# Patient Record
Sex: FEMALE | Race: BLACK OR AFRICAN AMERICAN | NOT HISPANIC OR LATINO | URBAN - METROPOLITAN AREA
[De-identification: names, ages, dates, MRNs, and addresses within clinical notes are randomized per-mention and may not be internally consistent; named-entity substitution may affect disease eponyms.]

---

## 2018-09-20 PROBLEM — 271832001 FLATULENCE, ERUCTATION AND GAS PAIN: Status: ACTIVE | Noted: 2018-09-20

## 2018-09-20 PROBLEM — 14760008 CONSTIPATION: Status: ACTIVE | Noted: 2018-09-20

## 2018-09-20 PROBLEM — 442076002 EARLY SATIETY: Status: ACTIVE | Noted: 2018-09-20

## 2018-09-20 PROBLEM — 275978004 SCREENING FOR MALIGNANT NEOPLASM OF COLON: Status: ACTIVE | Noted: 2018-09-20

## 2018-11-30 PROBLEM — 80774000 HELICOBACTER PYLORI: Status: ACTIVE | Noted: 2018-11-30

## 2022-04-30 ENCOUNTER — TELEPHONE ENCOUNTER (OUTPATIENT)
Dept: URBAN - METROPOLITAN AREA CLINIC 121 | Facility: CLINIC | Age: 62
End: 2022-04-30

## 2022-05-01 ENCOUNTER — TELEPHONE ENCOUNTER (OUTPATIENT)
Dept: URBAN - METROPOLITAN AREA CLINIC 121 | Facility: CLINIC | Age: 62
End: 2022-05-01

## 2022-05-01 RX ORDER — EUCALYPTUS OIL/MENTHOL/CAMPHOR 1.2%-4.8%
OINTMENT (GRAM) TOPICAL
Status: ACTIVE | COMMUNITY
Start: 2018-09-20

## 2022-05-01 RX ORDER — DOXYCYCLINE HYCLATE 100 MG/1
TAKE 1 TABLET PO WITH DINNER FOR 14 DAYS FOR H. PYLORI TABLET ORAL
Status: ACTIVE | COMMUNITY
Start: 2018-09-20

## 2022-05-01 RX ORDER — NITAZOXANIDE 500 MG/1
TAKE 1 TABLET WITH BREAKFAST, AND 1 TABLET WITH DINNER FOR 14 DAYS, FOR H.PYLORI TABLET ORAL
Status: ACTIVE | COMMUNITY
Start: 2018-09-20

## 2022-05-01 RX ORDER — IRON HEME POLYPEPTIDE/FOLIC AC 12-1MG
TABLET ORAL
Status: ACTIVE | COMMUNITY
Start: 2018-09-20

## 2022-05-01 RX ORDER — PROGESTERONE 100 MG/1
QD CAPSULE ORAL
Status: ACTIVE | COMMUNITY
Start: 2018-09-20

## 2022-05-01 RX ORDER — OMEGA-3/DHA/EPA/FISH OIL 1000 MG
QD CAPSULE ORAL
Status: ACTIVE | COMMUNITY
Start: 2018-09-20

## 2022-05-01 RX ORDER — LEVOFLOXACIN 250 MG/1
TAKE 1 TABLET WITH BREAKFAST FOR 14 DAYS FOR H. PYLORI TABLET, FILM COATED ORAL
Status: ACTIVE | COMMUNITY
Start: 2018-09-20

## 2022-05-01 RX ORDER — LINACLOTIDE 145 UG/1
1 TAB PO QD MUST TAKE ON AN EMPTY STOMACH CAPSULE, GELATIN COATED ORAL
Status: ACTIVE | COMMUNITY
Start: 2018-11-30

## 2022-05-01 RX ORDER — OMEPRAZOLE 40 MG/1
TAKE 1 CAPSULE PO 30 MINS BEFORE BREAKFAST FOR 14 DAYS CAPSULE, DELAYED RELEASE ORAL
Status: ACTIVE | COMMUNITY
Start: 2018-09-20

## 2022-05-01 RX ORDER — NUT.TX.IMPAIRED DIGESTIVE FXN 0.035-1/ML
DRINK 1 BOTTLE QID DURING COLON PREP LIQUID (ML) ORAL
Status: ACTIVE | COMMUNITY
Start: 2018-09-20

## 2022-05-01 RX ORDER — SODIUM SULFATE, POTASSIUM SULFATE, MAGNESIUM SULFATE 17.5; 3.13; 1.6 G/ML; G/ML; G/ML
MIX AND USE AS DIRECTED SOLUTION, CONCENTRATE ORAL
Status: ACTIVE | COMMUNITY
Start: 2018-09-20

## 2022-05-01 RX ORDER — NITAZOXANIDE 500 MG/1
TAKE 1 TABLET WITH BREAKFAST, AND TAKE 1 TABLET WITH DINNER FOR 10 DAYS FOR H.PYLORI INFECTION TABLET ORAL
Status: ACTIVE | COMMUNITY
Start: 2018-10-02

## 2023-09-01 ENCOUNTER — CLAIMS CREATED FROM THE CLAIM WINDOW (OUTPATIENT)
Dept: URBAN - METROPOLITAN AREA CLINIC 27 | Facility: CLINIC | Age: 63
End: 2023-09-01

## 2023-09-01 ENCOUNTER — WEB ENCOUNTER (OUTPATIENT)
Dept: URBAN - METROPOLITAN AREA CLINIC 27 | Facility: CLINIC | Age: 63
End: 2023-09-01

## 2023-09-01 ENCOUNTER — OFFICE VISIT (OUTPATIENT)
Dept: URBAN - METROPOLITAN AREA CLINIC 27 | Facility: CLINIC | Age: 63
End: 2023-09-01

## 2023-09-01 ENCOUNTER — DASHBOARD ENCOUNTERS (OUTPATIENT)
Age: 63
End: 2023-09-01

## 2023-09-01 ENCOUNTER — CLAIMS CREATED FROM THE CLAIM WINDOW (OUTPATIENT)
Dept: URBAN - METROPOLITAN AREA CLINIC 27 | Facility: CLINIC | Age: 63
End: 2023-09-01
Payer: COMMERCIAL

## 2023-09-01 VITALS
SYSTOLIC BLOOD PRESSURE: 127 MMHG | HEIGHT: 67 IN | DIASTOLIC BLOOD PRESSURE: 77 MMHG | WEIGHT: 156 LBS | BODY MASS INDEX: 24.48 KG/M2 | HEART RATE: 60 BPM

## 2023-09-01 DIAGNOSIS — Z12.11 SCREEN FOR COLON CANCER: ICD-10-CM

## 2023-09-01 DIAGNOSIS — K59.04 CHRONIC IDIOPATHIC CONSTIPATION: ICD-10-CM

## 2023-09-01 PROCEDURE — 99203 OFFICE O/P NEW LOW 30 MIN: CPT | Performed by: INTERNAL MEDICINE

## 2023-09-01 RX ORDER — NITAZOXANIDE 500 MG/1
TAKE 1 TABLET WITH BREAKFAST, AND TAKE 1 TABLET WITH DINNER FOR 10 DAYS FOR H.PYLORI INFECTION TABLET ORAL
Status: ACTIVE | COMMUNITY
Start: 2018-10-02

## 2023-09-01 RX ORDER — LEVOFLOXACIN 250 MG/1
TAKE 1 TABLET WITH BREAKFAST FOR 14 DAYS FOR H. PYLORI TABLET, FILM COATED ORAL
Status: ACTIVE | COMMUNITY
Start: 2018-09-20

## 2023-09-01 RX ORDER — ONDANSETRON HYDROCHLORIDE 4 MG/1
1 TABLET TABLET, FILM COATED ORAL
Qty: 2 | Refills: 0 | OUTPATIENT
Start: 2023-09-01

## 2023-09-01 RX ORDER — EUCALYPTUS OIL/MENTHOL/CAMPHOR 1.2%-4.8%
OINTMENT (GRAM) TOPICAL
Status: ACTIVE | COMMUNITY
Start: 2018-09-20

## 2023-09-01 RX ORDER — PROGESTERONE 100 MG/1
QD CAPSULE ORAL
Status: ACTIVE | COMMUNITY
Start: 2018-09-20

## 2023-09-01 RX ORDER — OMEGA-3/DHA/EPA/FISH OIL 1000 MG
QD CAPSULE ORAL
Status: ACTIVE | COMMUNITY
Start: 2018-09-20

## 2023-09-01 RX ORDER — DOXYCYCLINE HYCLATE 100 MG/1
TAKE 1 TABLET PO WITH DINNER FOR 14 DAYS FOR H. PYLORI TABLET ORAL
Status: ACTIVE | COMMUNITY
Start: 2018-09-20

## 2023-09-01 RX ORDER — OMEPRAZOLE 40 MG/1
TAKE 1 CAPSULE PO 30 MINS BEFORE BREAKFAST FOR 14 DAYS CAPSULE, DELAYED RELEASE ORAL
Status: ACTIVE | COMMUNITY
Start: 2018-09-20

## 2023-09-01 RX ORDER — SODIUM SULFATE, POTASSIUM SULFATE, MAGNESIUM SULFATE 17.5; 3.13; 1.6 G/ML; G/ML; G/ML
MIX AND USE AS DIRECTED SOLUTION, CONCENTRATE ORAL
Status: ACTIVE | COMMUNITY
Start: 2018-09-20

## 2023-09-01 RX ORDER — SODIUM, POTASSIUM,MAG SULFATES 17.5-3.13G
177ML SOLUTION, RECONSTITUTED, ORAL ORAL
Qty: 1 KIT | Refills: 0 | OUTPATIENT
Start: 2023-09-01 | End: 2023-09-02

## 2023-09-01 RX ORDER — LINACLOTIDE 145 UG/1
1 TAB PO QD MUST TAKE ON AN EMPTY STOMACH CAPSULE, GELATIN COATED ORAL
Status: ACTIVE | COMMUNITY
Start: 2018-11-30

## 2023-09-01 RX ORDER — IRON HEME POLYPEPTIDE/FOLIC AC 12-1MG
TABLET ORAL
Status: ACTIVE | COMMUNITY
Start: 2018-09-20

## 2023-09-01 RX ORDER — NUT.TX.IMPAIRED DIGESTIVE FXN 0.035-1/ML
DRINK 1 BOTTLE QID DURING COLON PREP LIQUID (ML) ORAL
Status: ACTIVE | COMMUNITY
Start: 2018-09-20

## 2023-09-01 NOTE — HPI-TODAY'S VISIT:
Ms. Dumont is a  62 year old woman who presents today per request by Dr. Horta  for colon cancer screening.  She  had a colonoscopy  5 years ago significant foe a polyp.  She does not have any family history of colon cancer or polyps.  She does suffer from constipation.  She has a bowel movment every 4 days.

## 2023-09-08 ENCOUNTER — LAB OUTSIDE AN ENCOUNTER (OUTPATIENT)
Dept: URBAN - METROPOLITAN AREA CLINIC 27 | Facility: CLINIC | Age: 63
End: 2023-09-08

## 2023-09-21 ENCOUNTER — CLAIMS CREATED FROM THE CLAIM WINDOW (OUTPATIENT)
Dept: URBAN - METROPOLITAN AREA SURGERY CENTER 7 | Facility: SURGERY CENTER | Age: 63
End: 2023-09-21
Payer: COMMERCIAL

## 2023-09-21 DIAGNOSIS — K64.8 HEMORRHOIDS, INTERNAL. NON-BLEEDING: ICD-10-CM

## 2023-09-21 DIAGNOSIS — Z86.010 PERSONAL HISTORY OF COLON POLYPS: ICD-10-CM

## 2023-09-21 DIAGNOSIS — Z12.11 COLON CANCER SCREENING (HIGH RISK): ICD-10-CM

## 2023-09-21 DIAGNOSIS — Z86.010 ADENOMAS PERSONAL HISTORY OF COLONIC POLYPS: ICD-10-CM

## 2023-09-21 PROCEDURE — G0105 COLORECTAL SCRN; HI RISK IND: HCPCS | Performed by: INTERNAL MEDICINE

## 2023-09-21 PROCEDURE — G8907 PT DOC NO EVENTS ON DISCHARG: HCPCS | Performed by: INTERNAL MEDICINE

## 2023-09-21 PROCEDURE — 00811 ANES LWR INTST NDSC NOS: CPT | Performed by: NURSE ANESTHETIST, CERTIFIED REGISTERED

## 2023-09-21 RX ORDER — EUCALYPTUS OIL/MENTHOL/CAMPHOR 1.2%-4.8%
OINTMENT (GRAM) TOPICAL
Status: ACTIVE | COMMUNITY
Start: 2018-09-20

## 2023-09-21 RX ORDER — OMEPRAZOLE 40 MG/1
TAKE 1 CAPSULE PO 30 MINS BEFORE BREAKFAST FOR 14 DAYS CAPSULE, DELAYED RELEASE ORAL
Status: ACTIVE | COMMUNITY
Start: 2018-09-20

## 2023-09-21 RX ORDER — IRON HEME POLYPEPTIDE/FOLIC AC 12-1MG
TABLET ORAL
Status: ACTIVE | COMMUNITY
Start: 2018-09-20

## 2023-09-21 RX ORDER — LEVOFLOXACIN 250 MG/1
TAKE 1 TABLET WITH BREAKFAST FOR 14 DAYS FOR H. PYLORI TABLET, FILM COATED ORAL
Status: ACTIVE | COMMUNITY
Start: 2018-09-20

## 2023-09-21 RX ORDER — LINACLOTIDE 145 UG/1
1 TAB PO QD MUST TAKE ON AN EMPTY STOMACH CAPSULE, GELATIN COATED ORAL
Status: ACTIVE | COMMUNITY
Start: 2018-11-30

## 2023-09-21 RX ORDER — DOXYCYCLINE HYCLATE 100 MG/1
TAKE 1 TABLET PO WITH DINNER FOR 14 DAYS FOR H. PYLORI TABLET ORAL
Status: ACTIVE | COMMUNITY
Start: 2018-09-20

## 2023-09-21 RX ORDER — NITAZOXANIDE 500 MG/1
TAKE 1 TABLET WITH BREAKFAST, AND TAKE 1 TABLET WITH DINNER FOR 10 DAYS FOR H.PYLORI INFECTION TABLET ORAL
Status: ACTIVE | COMMUNITY
Start: 2018-10-02

## 2023-09-21 RX ORDER — PROGESTERONE 100 MG/1
QD CAPSULE ORAL
Status: ACTIVE | COMMUNITY
Start: 2018-09-20

## 2023-09-21 RX ORDER — NUT.TX.IMPAIRED DIGESTIVE FXN 0.035-1/ML
DRINK 1 BOTTLE QID DURING COLON PREP LIQUID (ML) ORAL
Status: ACTIVE | COMMUNITY
Start: 2018-09-20

## 2023-09-21 RX ORDER — OMEGA-3/DHA/EPA/FISH OIL 1000 MG
QD CAPSULE ORAL
Status: ACTIVE | COMMUNITY
Start: 2018-09-20

## 2023-09-21 RX ORDER — ONDANSETRON HYDROCHLORIDE 4 MG/1
1 TABLET TABLET, FILM COATED ORAL
Qty: 2 | Refills: 0 | Status: ACTIVE | COMMUNITY
Start: 2023-09-01

## 2023-09-21 RX ORDER — SODIUM SULFATE, POTASSIUM SULFATE, MAGNESIUM SULFATE 17.5; 3.13; 1.6 G/ML; G/ML; G/ML
MIX AND USE AS DIRECTED SOLUTION, CONCENTRATE ORAL
Status: ACTIVE | COMMUNITY
Start: 2018-09-20

## 2024-07-31 ENCOUNTER — LAB OUTSIDE AN ENCOUNTER (OUTPATIENT)
Dept: URBAN - METROPOLITAN AREA CLINIC 29 | Facility: CLINIC | Age: 64
End: 2024-07-31

## 2024-07-31 ENCOUNTER — OFFICE VISIT (OUTPATIENT)
Dept: URBAN - METROPOLITAN AREA CLINIC 29 | Facility: CLINIC | Age: 64
End: 2024-07-31

## 2024-07-31 VITALS
DIASTOLIC BLOOD PRESSURE: 80 MMHG | SYSTOLIC BLOOD PRESSURE: 121 MMHG | WEIGHT: 167 LBS | BODY MASS INDEX: 26.21 KG/M2 | HEIGHT: 67 IN | HEART RATE: 68 BPM

## 2024-07-31 RX ORDER — NUT.TX.IMPAIRED DIGESTIVE FXN 0.035-1/ML
DRINK 1 BOTTLE QID DURING COLON PREP LIQUID (ML) ORAL
Status: DISCONTINUED | COMMUNITY
Start: 2018-09-20

## 2024-07-31 RX ORDER — SODIUM SULFATE, POTASSIUM SULFATE, MAGNESIUM SULFATE 17.5; 3.13; 1.6 G/ML; G/ML; G/ML
MIX AND USE AS DIRECTED SOLUTION, CONCENTRATE ORAL
Status: DISCONTINUED | COMMUNITY
Start: 2018-09-20

## 2024-07-31 RX ORDER — IRON HEME POLYPEPTIDE/FOLIC AC 12-1MG
TABLET ORAL
Status: ACTIVE | COMMUNITY
Start: 2018-09-20

## 2024-07-31 RX ORDER — OMEPRAZOLE 40 MG/1
TAKE 1 CAPSULE PO 30 MINS BEFORE BREAKFAST FOR 14 DAYS CAPSULE, DELAYED RELEASE ORAL
Status: DISCONTINUED | COMMUNITY
Start: 2018-09-20

## 2024-07-31 RX ORDER — EUCALYPTUS OIL/MENTHOL/CAMPHOR 1.2%-4.8%
OINTMENT (GRAM) TOPICAL
Status: ACTIVE | COMMUNITY
Start: 2018-09-20

## 2024-07-31 RX ORDER — LEVOFLOXACIN 250 MG/1
TAKE 1 TABLET WITH BREAKFAST FOR 14 DAYS FOR H. PYLORI TABLET, FILM COATED ORAL
Status: DISCONTINUED | COMMUNITY
Start: 2018-09-20

## 2024-07-31 RX ORDER — ONDANSETRON HYDROCHLORIDE 4 MG/1
1 TABLET TABLET, FILM COATED ORAL
Qty: 2 | Refills: 0 | Status: DISCONTINUED | COMMUNITY
Start: 2023-09-01

## 2024-07-31 RX ORDER — LINACLOTIDE 145 UG/1
1 TAB PO QD MUST TAKE ON AN EMPTY STOMACH CAPSULE, GELATIN COATED ORAL
Status: DISCONTINUED | COMMUNITY
Start: 2018-11-30

## 2024-07-31 RX ORDER — OMEGA-3/DHA/EPA/FISH OIL 1000 MG
QD CAPSULE ORAL
Status: DISCONTINUED | COMMUNITY
Start: 2018-09-20

## 2024-07-31 RX ORDER — NITAZOXANIDE 500 MG/1
TAKE 1 TABLET WITH BREAKFAST, AND TAKE 1 TABLET WITH DINNER FOR 10 DAYS FOR H.PYLORI INFECTION TABLET ORAL
Status: DISCONTINUED | COMMUNITY
Start: 2018-10-02

## 2024-07-31 RX ORDER — DOXYCYCLINE HYCLATE 100 MG/1
TAKE 1 TABLET PO WITH DINNER FOR 14 DAYS FOR H. PYLORI TABLET ORAL
Status: DISCONTINUED | COMMUNITY
Start: 2018-09-20

## 2024-07-31 RX ORDER — PROGESTERONE 100 MG/1
QD CAPSULE ORAL
Status: ACTIVE | COMMUNITY
Start: 2018-09-20

## 2024-07-31 NOTE — HPI-TODAY'S VISIT:
Ms. Horta is a 63-year-old woman who is here for bloating and constipation.   SHe is also concerned about weight gain.  She states that she has been having a bowel movements 2-3 times a work.  She has been taking MiraLAX which helps.     She states that she has bloating after meals and has abdominal pain and lethargy.     She does have nausea.

## 2024-08-15 ENCOUNTER — OFFICE VISIT (OUTPATIENT)
Dept: URBAN - METROPOLITAN AREA SURGERY CENTER 7 | Facility: SURGERY CENTER | Age: 64
End: 2024-08-15

## 2025-03-18 ENCOUNTER — OFFICE VISIT (OUTPATIENT)
Dept: URBAN - METROPOLITAN AREA CLINIC 29 | Facility: CLINIC | Age: 65
End: 2025-03-18
Payer: COMMERCIAL

## 2025-03-18 ENCOUNTER — LAB OUTSIDE AN ENCOUNTER (OUTPATIENT)
Dept: URBAN - METROPOLITAN AREA CLINIC 29 | Facility: CLINIC | Age: 65
End: 2025-03-18

## 2025-03-18 VITALS
DIASTOLIC BLOOD PRESSURE: 68 MMHG | BODY MASS INDEX: 26.84 KG/M2 | HEIGHT: 67 IN | WEIGHT: 171 LBS | SYSTOLIC BLOOD PRESSURE: 97 MMHG | HEART RATE: 69 BPM

## 2025-03-18 DIAGNOSIS — Z87.19 HISTORY OF GASTROESOPHAGEAL REFLUX (GERD): ICD-10-CM

## 2025-03-18 DIAGNOSIS — R14.0 BLOATING: ICD-10-CM

## 2025-03-18 DIAGNOSIS — K59.04 CHRONIC IDIOPATHIC CONSTIPATION: ICD-10-CM

## 2025-03-18 PROCEDURE — 99214 OFFICE O/P EST MOD 30 MIN: CPT | Performed by: INTERNAL MEDICINE

## 2025-03-18 RX ORDER — EUCALYPTUS OIL/MENTHOL/CAMPHOR 1.2%-4.8%
OINTMENT (GRAM) TOPICAL
Status: ACTIVE | COMMUNITY
Start: 2018-09-20

## 2025-03-18 RX ORDER — IRON HEME POLYPEPTIDE/FOLIC AC 12-1MG
TABLET ORAL
Status: DISCONTINUED | COMMUNITY
Start: 2018-09-20

## 2025-03-18 RX ORDER — PROGESTERONE 100 MG/1
QD CAPSULE ORAL
Status: ACTIVE | COMMUNITY
Start: 2018-09-20

## 2025-03-18 NOTE — HPI-TODAY'S VISIT:
Ms. Crowe presents for a follow-up visit regarding her gastroenterological issues. She reports feeling "pretty good" overall since her last visit in August, noting significant improvement in her symptoms after making dietary changes and starting a daily probiotic.  The patient previously experienced bloating after meals, abdominal pain, lethargy, and nausea. Currently, she reports only occasional bloating, which she describes as occurring "every blue moon." She has noticed that bread and flour-based foods tend to exacerbate her symptoms, so she tries to avoid them. Ms. Crowe finds relief by drinking seltzer water, which helps her belch and alleviate discomfort.  Regarding her GERD symptoms, the patient mentions feeling hoarse, which she attributes to allergies. She is not currently taking any medication for reflux. Ms. Crowe also reports ongoing constipation, having bowel movements every other day on average. She notes a recent 7-day cruise during which she did not have any bowel movements, which she found unusual.  The patient has made several lifestyle changes since her last visit. She has stopped eating "junk or processed stuff" and has been taking a daily probiotic from a vitamin shop. She also mentions avoiding eating before going to the gym in the morning, as it causes her food to regurgitate.  Despite feeling better overall, Ms. Crowe expresses some apprehension about her upcoming endoscopy, noting that she has been putting it off since feeling unwell in August.

## 2025-03-18 NOTE — PHYSICAL EXAM GASTROINTESTINAL
Abdomen , soft, epigastrium fullness and tenderness, nondistended , no guarding or rigidity , no masses palpable , normal bowel sounds , Liver and Spleen,  no hepatosplenomegaly , liver nontender , Abdomen , soft, nontender, nondistended , no guarding or rigidity , no masses palpable , normal bowel sounds , Liver and Spleen,  no hepatosplenomegaly , liver nontender

## 2025-03-25 ENCOUNTER — OFFICE VISIT (OUTPATIENT)
Dept: URBAN - METROPOLITAN AREA SURGERY CENTER 7 | Facility: SURGERY CENTER | Age: 65
End: 2025-03-25

## 2025-03-25 ENCOUNTER — CLAIMS CREATED FROM THE CLAIM WINDOW (OUTPATIENT)
Dept: URBAN - METROPOLITAN AREA CLINIC 4 | Facility: CLINIC | Age: 65
End: 2025-03-25
Payer: COMMERCIAL

## 2025-03-25 ENCOUNTER — CLAIMS CREATED FROM THE CLAIM WINDOW (OUTPATIENT)
Dept: URBAN - METROPOLITAN AREA SURGERY CENTER 7 | Facility: SURGERY CENTER | Age: 65
End: 2025-03-25
Payer: COMMERCIAL

## 2025-03-25 DIAGNOSIS — K21.9 GASTRO-ESOPHAGEAL REFLUX DISEASE WITHOUT ESOPHAGITIS: ICD-10-CM

## 2025-03-25 DIAGNOSIS — K31.89 OTHER DISEASES OF STOMACH AND DUODENUM: ICD-10-CM

## 2025-03-25 PROCEDURE — 88312 SPECIAL STAINS GROUP 1: CPT | Performed by: PATHOLOGY

## 2025-03-25 PROCEDURE — 88305 TISSUE EXAM BY PATHOLOGIST: CPT | Performed by: PATHOLOGY

## 2025-03-25 PROCEDURE — 00731 ANES UPR GI NDSC PX NOS: CPT | Performed by: NURSE ANESTHETIST, CERTIFIED REGISTERED

## 2025-03-25 RX ORDER — PROGESTERONE 100 MG/1
QD CAPSULE ORAL
Status: ACTIVE | COMMUNITY
Start: 2018-09-20

## 2025-03-25 RX ORDER — EUCALYPTUS OIL/MENTHOL/CAMPHOR 1.2%-4.8%
OINTMENT (GRAM) TOPICAL
Status: ACTIVE | COMMUNITY
Start: 2018-09-20